# Patient Record
Sex: FEMALE | Race: WHITE | NOT HISPANIC OR LATINO | Employment: OTHER | URBAN - METROPOLITAN AREA
[De-identification: names, ages, dates, MRNs, and addresses within clinical notes are randomized per-mention and may not be internally consistent; named-entity substitution may affect disease eponyms.]

---

## 2017-01-01 ENCOUNTER — AMBULATORY - HEALTHEAST (OUTPATIENT)
Dept: CARDIOLOGY | Facility: CLINIC | Age: 82
End: 2017-01-01

## 2017-01-01 ENCOUNTER — COMMUNICATION - HEALTHEAST (OUTPATIENT)
Dept: ADMINISTRATIVE | Facility: CLINIC | Age: 82
End: 2017-01-01

## 2017-01-01 ENCOUNTER — COMMUNICATION - HEALTHEAST (OUTPATIENT)
Dept: CARDIOLOGY | Facility: CLINIC | Age: 82
End: 2017-01-01

## 2017-01-01 ENCOUNTER — COMMUNICATION - HEALTHEAST (OUTPATIENT)
Dept: TELEHEALTH | Facility: CLINIC | Age: 82
End: 2017-01-01

## 2017-01-01 ENCOUNTER — OFFICE VISIT - HEALTHEAST (OUTPATIENT)
Dept: CARDIOLOGY | Facility: CLINIC | Age: 82
End: 2017-01-01

## 2017-01-01 ENCOUNTER — SURGERY - HEALTHEAST (OUTPATIENT)
Dept: CARDIOLOGY | Facility: CLINIC | Age: 82
End: 2017-01-01

## 2017-01-01 DIAGNOSIS — I48.20 CHRONIC ATRIAL FIBRILLATION (H): ICD-10-CM

## 2017-01-01 DIAGNOSIS — I27.21 PAH (PULMONARY ARTERY HYPERTENSION) (H): ICD-10-CM

## 2017-01-01 DIAGNOSIS — I27.29 PULMONARY ARTERIAL HYPERTENSION ASSOCIATED WITH CONGENITAL HEART DISEASE (H): ICD-10-CM

## 2017-01-01 DIAGNOSIS — I50.32 CHRONIC DIASTOLIC HEART FAILURE (H): ICD-10-CM

## 2017-01-01 DIAGNOSIS — I50.32 CHRONIC DIASTOLIC (CONGESTIVE) HEART FAILURE (H): ICD-10-CM

## 2017-01-01 DIAGNOSIS — Q24.9 PULMONARY ARTERIAL HYPERTENSION ASSOCIATED WITH CONGENITAL HEART DISEASE (H): ICD-10-CM

## 2017-01-01 DIAGNOSIS — I48.91 ATRIAL FIBRILLATION (H): ICD-10-CM

## 2017-01-01 RX ORDER — DILTIAZEM HYDROCHLORIDE 180 MG/1
180 CAPSULE, COATED, EXTENDED RELEASE ORAL
Status: SHIPPED | COMMUNITY
Start: 2016-06-10

## 2017-01-01 RX ORDER — FERROUS SULFATE 325(65) MG
1 TABLET ORAL
Status: SHIPPED | COMMUNITY
Start: 2017-01-01

## 2017-01-01 ASSESSMENT — MIFFLIN-ST. JEOR: SCORE: 935.6

## 2017-01-04 ENCOUNTER — COMMUNICATION - HEALTHEAST (OUTPATIENT)
Dept: CARDIOLOGY | Facility: CLINIC | Age: 82
End: 2017-01-04

## 2018-01-01 ENCOUNTER — COMMUNICATION - HEALTHEAST (OUTPATIENT)
Dept: ADMINISTRATIVE | Facility: CLINIC | Age: 83
End: 2018-01-01

## 2018-02-19 ENCOUNTER — RECORDS - HEALTHEAST (OUTPATIENT)
Dept: ADMINISTRATIVE | Facility: OTHER | Age: 83
End: 2018-02-19

## 2018-02-19 ENCOUNTER — COMMUNICATION - HEALTHEAST (OUTPATIENT)
Dept: CARDIOLOGY | Facility: CLINIC | Age: 83
End: 2018-02-19

## 2021-05-30 VITALS — HEIGHT: 64 IN | WEIGHT: 128 LBS | BODY MASS INDEX: 21.85 KG/M2

## 2021-06-09 NOTE — PROGRESS NOTES
[facility] HEART [facility]   Arrhythmia Clinic    Assessment/Plan:  Diagnoses and all orders for this visit:    Chronic atrial fibrillation and on rate control.  On diltiazem 180 mg p.o. daily plus metoprolol succinate 25 p.o. daily.  Noting heart rates in the high 80s and low 90s at rest.  I previously walked her at last visit and noted heart rates elevated to 100-110s.  I do not think we can go down any further on heart rate medications.  Due to chronic kidney disease, I do not recommend to switch her to digoxin to replace part  Or any of her heart rate meds.  If she has issues with hypotension on current meds, we would have to consider AV node ablation with permanent pacemaker.  [id number] Score  of 5 and on low-dose Eliquis due to advanced age and less than 60 kg.  Has bled score of 2.  Due to recent fall and large hematoma on buttocks with fall and unsteady on her feet and advanced age but no terminal illness and high risk for stroke I discussed watchman device.  I discussed pre-watchman ROSETTA as well as post ROSETTA.  I discussed watchman procedure.  I discussed anticoagulation protocol.  Her watchman would be need to be scheduled with her daughter since she was coming from [place] and we would need to group appointments as much as possible.  It would probably be too trips as the second ROSETTA is 6 weeks post watchman.  They are to consider this information and call Yokasta Nguyen RN in our clinic if they are interested in watchman procedure and would recommend that Elvira be scheduled with Dr. Rodriguez.    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  To follow-up with Dr. Ruiz in clinic in 6 months.    Chronic diastolic heart failure and no symptoms of decompensated heart failure today.  Discussed with Elvira importance of taking diuretic consistently.  Currently on 40 mg of furosemide daily.  To continue for now.  Recommended support stockings at home if  possible.  She is going to have some home care to help her out.    Other orders  -     diltiazem (CARDIZEM CD) 180 MG 24 hr capsule; Take 180 mg by mouth.  -     ferrous sulfate 325 (65 FE) MG tablet; Take 1 tablet by mouth daily with breakfast.    40 minutes were spent in face-to-face counseling with focus on options for treatment for chronic atrial fib.      ______________________________________________________________________    Subjective:    I had the opportunity to see Gladys O Frokjer at the [facility]. Gladys O Frokjer is a 89 y.o. female and here for EP follow-up after being discharged from transitional care.  She fell in her bathroom at time of last hospitalization in [place] due to unsteadiness on her feet and had a large hematoma on left buttocks and required 3 units of packed red blood cells.  She was not taking her diuretic consistently as well and was treated for heart failure.  Diuretic has been weaned to furosemide 40 mg p.o. daily.  She is on potassium supplement as well.  She had some hypotension in transitional care per daughter but likely due to diuretics at the time.  Elvira had aggressive physical therapy during transitional care stay.  She is going home now.  She is going to have some home care.  She lives in a senior complex and gets some of her food there.   Gladys O Frokjer has a known history of chronic A. fib and is on rate control. She also has pulmonary artery hypertension and chronic diastolic heart failure.  She follows with Dr. Ruiz in our clinic and has chronic kidney disease.  She has some shortness of breath with activity but improved.  Elvira tells me that she does not feel as strong as her daughter describes her.  She agrees that rehab was hard for her as they did rehab sometimes 4 times a day.  She is walking a lot more there with a walker than she normally does in her building.  Discussed again today importance of taking her diuretic consistently and has  had heart failure admission in September 2016 due to inconsistent use of diuretic.  I believe this is the reason she is going home care nurse to help with meds, which is new per daughter.  She infrequently has some dizziness.  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     Chronic diastolic heart failure     Medical History:  Past Medical History:   Diagnosis Date     Benign Hypertensive Heart Disease Without Congestive Heart Failure      Carotid Artery Plaque      Chronic kidney disease      Congenital heart disease     PDA, closed at age 30     Coronary Arteriosclerosis, mild non-obstructive      Depression      Dry senile macular degeneration     will not benefit from injections per retinal specialists     Essential hypertension      Permanent atrial fibrillation      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     vasodilator responsive     Surgical History:  Past Surgical History:   Procedure Laterality Date     CARDIAC CATHETERIZATION  2009    for PH, was vasodilator responsive     ligation of PDA  1951     NE TOTAL HIP ARTHROPLASTY       Social History:  Social History   Substance Use Topics     Smoking status: Never Smoker     Smokeless tobacco: Never Used     Alcohol use No        Review of Systems: Review of Systems:   General: WNL  Eyes: WNL  Ears/Nose/Throat: WNL  Lungs: WNL  Heart: WNL  Stomach: WNL  Bladder: WNL  Muscle/Joints: WNL  Skin: WNL  Nervous System: WNL  Mental Health: WNL     Blood: WNL      Family History:  Family History   Problem Relation Age of Onset     Heart attack Father      Kidney failure Brother      Heart attack Brother      Congenital heart disease Neg Hx      Pulmonary Hypertension Neg Hx   "        Allergies:  Allergies   Allergen Reactions     Adhesive      Codeine      Latex Rash     Medications:  Current Outpatient Prescriptions   Medication Sig Dispense Refill     acetaminophen (TYLENOL) 500 MG tablet Take 500 mg by mouth every 6 (six) hours as needed for pain.       apixaban (ELIQUIS) 2.5 mg Tab tablet Take 1 tablet (2.5 mg total) by mouth 2 (two) times a day. 180 tablet 2     citalopram (CELEXA) 10 MG tablet Take 5 mg by mouth daily.        citalopram (CELEXA) 10 MG tablet Take 10 mg by mouth.       diltiazem (CARDIZEM CD) 180 MG 24 hr capsule Take 180 mg by mouth.       ferrous sulfate 325 (65 FE) MG tablet Take 1 tablet by mouth daily with breakfast.       furosemide (LASIX) 40 MG tablet Take 20 mg by mouth.       GABAPENTIN ORAL Take 100 mg by mouth bedtime.        metoprolol succinate (TOPROL-XL) 25 MG Take 25 mg by mouth every evening.       potassium chloride (MICRO-K) 10 mEq CR capsule Take 10 mEq by mouth 2 (two) times a day.        VIT C/E/ZN/COPPR/LUTEIN/ZEAXAN (PRESERVISION AREDS 2 ORAL) Take 1 capsule by mouth daily.        docusate sodium (COLACE) 100 MG capsule Take 100 mg by mouth 2 (two) times a day as needed.        furosemide (LASIX) 20 MG tablet Take 0.5 tablets (10 mg total) by mouth daily.       No current facility-administered medications for this visit.        Objective:   Vital signs:  Visit Vitals     /66 (Patient Site: Right Arm, Patient Position: Sitting, Cuff Size: Adult Regular)     Pulse (!) 102     Resp 16     Ht 5' 4\" (1.626 m)     Wt 128 lb (58.1 kg)     SpO2 100%     BMI 21.97 kg/m2         Physical Exam:    GENERAL APPEARANCE: Alert, cooperative and in no acute distress.  HEENT: No scleral icterus. No Xanthelasma. Oral mucuos membranes pink and moist.  NECK: JVP Nl.   CHEST: clear to auscultation  CARDIOVASCULAR: S1, S2 without murmur ,clicks or rubs. Irregular, irregular and 92 at rest.  Radial and posterior tibial pulses are intact and " symetric.  EXTREMITIES: No cyanosis, clubbing or edema.  Wearing support stockings bilaterally.    Results personally reviewed:  January 2015 nuclear stress shows EF 66%. No evidence of ischemia. Small area of fixed anterior defect and likely due to breast attenuation.   July 2014 24-hour Holter shows A. fib throughout with average ventricular response 77 and niece tells me this was on diltiazem 240 mg by mouth daily.  Sept 2015 ECHO shows EF 50%. Mod biatrial enlargement. Mild pulm HTN. Mild AR.        TSH:   Lab Results   Component Value Date    TSH 0.89 12/10/2014     BNP:   Lab Results   Component Value Date     (H) 09/10/2015     BMP:  Lab Results   Component Value Date    CREATININE 1.50 (H) 09/10/2015    BUN 21 09/10/2015     09/10/2015    K 4.3 09/10/2015     09/10/2015    CO2 24 09/10/2015       This note has been dictated using voice recognition software. Any grammatical or context distortions are unintentional and inherent to the software.    BRYON CONSTANTINO RN, [facility]  [phone]

## 2021-06-09 NOTE — PROGRESS NOTES
3/15/1921  [phone] (home)  There is no such number on file (mobile).    +++Important patient information for CSC/Cath Lab staff : None+++    [facility] EP Cath Lab Procedure Order   Left Atrial Appendage Occlusion Device:  ESTELLA Occlusion Device Implant    Diagnosis:  AF  Anticipated Case Duration:  2  Scheduling Needs/Timeframe:  Please call daughter listed in chart to safia, she lives in [place] and would like to be present and clump apts together    MD Preference: Dr Michael SIMON Assist:  No Specific MD:  Dr Rodriguez    Current Device: None None  Device Company/Device Rep Needed for Procedure: None    Pre-Procedural Testing needed: ROSETTA  ICE Needed:  Yes  Implanting device company: Research to determine and notify CV , if pt declines research [employer]  Non-Invasive Cardiologist: Whole Case  Anesthesia:  General-Whole Case  Research Protocol:  Research Notified    [facility] EP Cath Lab Prep   Ordering Provider: Dr Rodriguez  Ordering Date: 3/16/2017  Orders Status: Intial order placed and Order set placed  EP NC Contact: Yokasta Nguyen RN    H&P:  EP NP to complete within 30 days prior to scheduled implant  PCP: Lani Meraz MD, [phone]    Pre-op Labs: Ordered AM of procedure    Medical Records Pertinent for Procedure:  Holter 9/10/15 EPIC and Echo 9/10/15 [facility]    Patient Education:    Teach with Patient: Teach with pt completed same day as pre-op H&P-see additional clinic note    Risks Reviewed:   Specific ESTELLA occlusion (WATCHMAN) risks (< 2%):      - device embolization (device moves from intended location)      - air embolism (air bubbles in the blood stream)      - heart perforation (hole in the heart)      - pericardial effusion (fluid collection in the sack around the heart)      - device thrombosis (clot on the device)      - atrial septal defect (hole between upper chambers of the heart)      - stroke or TIA      - death    Risks associated with heart catheterization:      - groin  bleeding/swelling      - AV fistula (hole between artery and vein)      - blood loss      - clot in the vein    ROSETTA risks:      - throat pain, esophageal bleeding/trauma      Consent: Will be obtained in [facility] day of procedure    Pre-Procedure Instructions that were Reviewed with Patient:  NPO after midnight, Notified patient of time and date of procedure by CV , Transportation arrangements needed s/p procedure, Post-procedure follow up process, Sedation plan/orders and Pre-procedure letter was sent to pt by CV     Pre-Procedure Medication Instructions:  Instructions given to pt regarding anticoagulants: Eliquis- instructed to continue anticoagulation uninterrupted through their procedure  Instructions given to pt regarding antiarrhythmic medication: N/A; N/A  Instructions for medication, other than anticoagulants/antiarrhythmics listed above, given to pt: to hold all morning medications      Allergies   Allergen Reactions     Adhesive      Codeine      Latex Rash       Current Outpatient Prescriptions:      acetaminophen (TYLENOL) 500 MG tablet, Take 500 mg by mouth every 6 (six) hours as needed for pain., Disp: , Rfl:      apixaban (ELIQUIS) 2.5 mg Tab tablet, Take 1 tablet (2.5 mg total) by mouth 2 (two) times a day., Disp: 180 tablet, Rfl: 2     citalopram (CELEXA) 10 MG tablet, Take 5 mg by mouth daily. , Disp: , Rfl:      citalopram (CELEXA) 10 MG tablet, Take 10 mg by mouth., Disp: , Rfl:      diltiazem (CARDIZEM CD) 180 MG 24 hr capsule, Take 180 mg by mouth., Disp: , Rfl:      docusate sodium (COLACE) 100 MG capsule, Take 100 mg by mouth 2 (two) times a day as needed. , Disp: , Rfl:      ferrous sulfate 325 (65 FE) MG tablet, Take 1 tablet by mouth daily with breakfast., Disp: , Rfl:      furosemide (LASIX) 20 MG tablet, Take 0.5 tablets (10 mg total) by mouth daily., Disp: , Rfl:      furosemide (LASIX) 40 MG tablet, Take 20 mg by mouth., Disp: , Rfl:      GABAPENTIN ORAL, Take 100 mg by  mouth bedtime. , Disp: , Rfl:      metoprolol succinate (TOPROL-XL) 25 MG, Take 25 mg by mouth every evening., Disp: , Rfl:      potassium chloride (MICRO-K) 10 mEq CR capsule, Take 10 mEq by mouth 2 (two) times a day. , Disp: , Rfl:      VIT C/E/ZN/COPPR/LUTEIN/ZEAXAN (PRESERVISION AREDS 2 ORAL), Take 1 capsule by mouth daily. , Disp: , Rfl: